# Patient Record
Sex: FEMALE | NOT HISPANIC OR LATINO | URBAN - METROPOLITAN AREA
[De-identification: names, ages, dates, MRNs, and addresses within clinical notes are randomized per-mention and may not be internally consistent; named-entity substitution may affect disease eponyms.]

---

## 2023-11-25 PROBLEM — R42 DIZZINESS: Status: ACTIVE | Noted: 2023-11-25

## 2023-11-25 PROBLEM — H93.8X1 SENSATION OF PLUGGED EAR ON RIGHT SIDE: Status: ACTIVE | Noted: 2023-11-25

## 2023-11-25 PROBLEM — R26.89 IMBALANCE: Status: ACTIVE | Noted: 2023-11-25

## 2023-11-25 PROBLEM — H90.3 ASYMMETRIC SNHL (SENSORINEURAL HEARING LOSS): Status: ACTIVE | Noted: 2023-11-25

## 2023-11-25 PROBLEM — H93.11 TINNITUS, RIGHT EAR: Status: ACTIVE | Noted: 2023-11-25

## 2023-11-25 RX ORDER — METOPROLOL SUCCINATE 50 MG/1
50 TABLET, EXTENDED RELEASE ORAL DAILY
COMMUNITY
Start: 2022-06-15

## 2023-11-29 ENCOUNTER — APPOINTMENT (OUTPATIENT)
Dept: SURGICAL ONCOLOGY | Facility: CLINIC | Age: 58
End: 2023-11-29
Payer: MEDICARE

## 2024-01-02 NOTE — PROGRESS NOTES
Chief Complaint     s/p left PM/SLNB 5/3/2023      History of Present IllnessReferred by vic     57-year-old Ashkenazi [employer],  female here for post op  s/p left PM/SLNB 5/3/2023  here alone today.      History:  1) No abnormal mammograms, breast biopsies or breast surgeries. no prior mammograms.   2) left breast mass for a month  3) 2023. Bilateral diagnostic imaging with bilateral ultrasound. Scattered fibroglandular tissue bilaterally. Right breast is notable for persistent lobulated low-density mass in the central lateral breast mid depth. In the left breast in the area of palpable concern lower inner quadrant there is a spiculated mass. Bilateral ultrasound. Right breast 9:00 3 cm from the nipple is notable for an 8 x 5 x 8 mm solid mass corresponding to the mammographic finding. Right axillary ultrasound is negative. In the left breast in the area of palpable concern corresponding to the mammographic finding at 834 cm from the nipple a 1.5 x 0.8 x 1.1 cm irregular mass was noted, BI-RADS 5. Left axillary ultrasound with 1 indeterminate lymph node and 1 normal lymph node. 2 site left-sided biopsy and right-sided biopsy is recommended.  4) 2023 3 site bx  right breast 9:00N3,benign. concordant U clip  left axillary LN. benign LN hydromark concordant.   left breast mass 8:30N4, grade 1 IDC, ER > 95% ND 40-50% Her2 neg. U clip  5) May 3, 2023. Left partial mastectomy, left mag seed localized sentinel lymph node biopsy. 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 Final margins negative. 5 negative lymph nodes. pT1c pN0 margins neg  6) mammaprint high risk -0.080        She presents today for post op. no breast concerns.      Ob/gyn history  menarche 14  menopause 55   , age of first delivery 20  no OCPs, no fertility treatments, no HRT     sister w breast cancer 60s  mother w uterine cancer     Physical Exam  A chaperone was offered for all portions of the  physical exam. The patient declined.      General appearance: appears stated age, alert and oriented x 3  Head: Normocephalic, atraumatic  Eyes: conjunctivae/corneas clear.  Ears: External ears are normal, hearing is grossly intact.  Lungs: normal breathing  Heart: regular   Abdomen: Soft, nontender, nondistended.  Neurologic: grossly normal  Lymph nodes: No cervical, supraclavicular or axillary lymphadenopathy bilaterally     Breast: A comprehensive breast exam was performed in the seated and supine positions. Breasts are symmetrical. Bilateral nipples are everted. There are no skin changes on arm maneuvers. Bra size: B            Right: no obvious masses. biopsy site clean.            Left: healed incision. no erythema.               *Results/Data  Imaging     reviewed by me  see [facility]     Pathology  4/11/2023 3 site bx  right breast 9:00N3,benign. concordant U clip  left axillary LN. benign LN hydromark concordant.   left breast mass 8:30N4, grade 1 IDC, ER > 95% MD 40-50% Her2 neg. U clip      Provider Impressions     1) 57-year-old Ashkenazi L[employer] day,  female here with new left breast cancer cT1 cN0 grade 1 IDC, ER > 95% MD 40-50% Her2 negative . benign right breast bx. now s/p left PM/SLNB pT1c pN0 margins neg. mammaprint high risk -0.080  2) comorbidities include hypertension. Current tobacco use.  3) Sister with breast cancer      *Patient Discussion/Summary     She is here alone. She is recovering well from surgery. She has no activity restrictions. We reviewed her pathology report she was given a copy. She is happy that the cancer was completely excised and negative margins. Lymph nodes were negative. Her surgical treatment is complete.  pathology review is pending. Her case was reviewed at tumor board yesterday. Oncotype has been ordered for systemic therapy recommendations and as she is a possible SANDY candidate. Bilateral mammogram is due in April. I would like to see her in November for an  exam. She should call the office with any sooner concerns.

## 2024-01-03 ENCOUNTER — APPOINTMENT (OUTPATIENT)
Dept: SURGICAL ONCOLOGY | Facility: CLINIC | Age: 59
End: 2024-01-03
Payer: COMMERCIAL

## 2024-04-17 ENCOUNTER — APPOINTMENT (OUTPATIENT)
Dept: SURGICAL ONCOLOGY | Facility: CLINIC | Age: 59
End: 2024-04-17
Payer: COMMERCIAL

## 2024-04-17 ENCOUNTER — APPOINTMENT (OUTPATIENT)
Dept: RADIOLOGY | Facility: CLINIC | Age: 59
End: 2024-04-17
Payer: COMMERCIAL

## 2024-04-18 ENCOUNTER — HOSPITAL ENCOUNTER (OUTPATIENT)
Dept: RADIOLOGY | Facility: EXTERNAL LOCATION | Age: 59
Discharge: HOME | End: 2024-04-18

## 2024-04-19 DIAGNOSIS — Z85.3 PERSONAL HISTORY OF BREAST CANCER: ICD-10-CM

## 2024-04-19 NOTE — PROGRESS NOTES
Chief Complaint     FU  Left breast cancer      History of Present Illness  Referred by vic     58-year-old Ashkenazi L[employer]-day,  female here for FU  Left breast cancer     History:  1) No abnormal mammograms, breast biopsies or breast surgeries. no prior mammograms.   2) left breast mass for a month  3) 2023. Bilateral diagnostic imaging with bilateral ultrasound. Scattered fibroglandular tissue bilaterally. Right breast is notable for persistent lobulated low-density mass in the central lateral breast mid depth. In the left breast in the area of palpable concern lower inner quadrant there is a spiculated mass. Bilateral ultrasound. Right breast 9:00 3 cm from the nipple is notable for an 8 x 5 x 8 mm solid mass corresponding to the mammographic finding. Right axillary ultrasound is negative. In the left breast in the area of palpable concern corresponding to the mammographic finding at 834 cm from the nipple a 1.5 x 0.8 x 1.1 cm irregular mass was noted, BI-RADS 5. Left axillary ultrasound with 1 indeterminate lymph node and 1 normal lymph node. 2 site left-sided biopsy and right-sided biopsy is recommended.  4) 2023 3 site bx  right breast 9:00N3,benign. concordant U clip  left axillary LN. benign LN hydromark concordant.   left breast mass 8:30N4, grade 1 IDC, ER > 95% IL 40-50% Her2 neg. U clip  5) May 3, 2023. Left partial mastectomy, left mag seed localized sentinel lymph node biopsy. 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 Final margins negative. 5 negative lymph nodes. pT1c pN0 margins neg  6) mammaprint high risk -0.080  7) oncotype 16  8) declined all adjuvant therapies  9) 2024 BL MG BIRADS 2          She presents today for FU  No breast concerns.        Ob/gyn history  menarche 14  menopause 55   , age of first delivery 20  no OCPs, no fertility treatments, no HRT     sister w breast cancer 60s  mother w uterine cancer        Review of  Systems  A comprehensive ROS was taken on the patient intake form and reviewed with the patient. This form is scanned into the electronic medical record.     Constitutional symptoms: Denies generalized fatigue. Denies weight change, fevers/chills, difficulty sleeping   Eyes: Denies double vision, glaucoma, cataracts. +blurry vision  Ear/nose/throat/mouth: Denies hearing changes, sore throat, sinus problems.  Cardiovascular: No chest pain. Denies irregular heartbeat. Denies ankle swelling.  Respiratory: No wheezing, cough, or shortness of breath.  Gastrointestinal: No abdominal pain, No nausea/vomiting. No indigestion/heartburn. No change in bowel habits. No constipation or diarrhea.   Genitourinary: No urinary incontinence. No urinary frequency. No painful urination.  Musculoskeletal: No bone pain, no muscle pain, no joint pain. + back pain  Integumentary: No rash. No masses. No changes in moles. No easy bruising.  Neurological: No headaches. No tremors. No numbness/tingling.  Psychiatric: No anxiety. No depression.  Endocrine: No excessive thirst. Not too hot or too cold. Not tired or fatigued.   Hematological/lymphatic: No swollen glands or blood clotting problems. No bruising.      Physical Exam  A chaperone was offered for all portions of the physical exam. The patient declined.      General appearance: appears stated age, alert and oriented x 3  Head: Normocephalic, atraumatic  Eyes: conjunctivae/corneas clear.  Ears: External ears are normal, hearing is grossly intact.  Lungs: normal breathing  Heart: regular   Abdomen: Soft, nontender, nondistended.  Neurologic: grossly normal  Lymph nodes: No cervical, supraclavicular or axillary lymphadenopathy bilaterally     Breast: A comprehensive breast exam was performed in the seated and supine positions. Breasts are symmetrical. Bilateral nipples are everted. There are no skin changes on arm maneuvers. Bra size: B            Right: no obvious masses.              Left: healed incision. No masses              *Results/Data  Imaging     reviewed by me  see HPI     Pathology  4/11/2023 3 site bx  right breast 9:00N3,benign. concordant U clip  left axillary LN. benign LN hydromark concordant.   left breast mass 8:30N4, grade 1 IDC, ER > 95% AR 40-50% Her2 neg. 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 benign right breast bx. now s/p left PM/SLNB pT1c pN0 margins neg. mammaprint high risk -0.080  2) comorbidities include hypertension. Current tobacco use.  3) Sister with breast cancer      Patient Discussion/Summary     She is here alone.  She declined all adjuvant therapies.  Clinical exam today is normal.  Bilateral mammogram today is negative for suspicious findings.  She is happy to hear this.  We discussed follow-up.  I advised yearly bilateral mammogram with clinical breast exam.  She can do this next year with the nurse practitioner.  She should call our office with any sooner concerns

## 2024-04-22 ENCOUNTER — HOSPITAL ENCOUNTER (OUTPATIENT)
Dept: RADIOLOGY | Facility: CLINIC | Age: 59
Discharge: HOME | End: 2024-04-22
Payer: COMMERCIAL

## 2024-04-22 ENCOUNTER — APPOINTMENT (OUTPATIENT)
Dept: RADIOLOGY | Facility: CLINIC | Age: 59
End: 2024-04-22
Payer: COMMERCIAL

## 2024-04-22 ENCOUNTER — OFFICE VISIT (OUTPATIENT)
Dept: SURGICAL ONCOLOGY | Facility: CLINIC | Age: 59
End: 2024-04-22
Payer: COMMERCIAL

## 2024-04-22 VITALS
BODY MASS INDEX: 28.34 KG/M2 | HEART RATE: 93 BPM | WEIGHT: 173 LBS | DIASTOLIC BLOOD PRESSURE: 89 MMHG | SYSTOLIC BLOOD PRESSURE: 168 MMHG

## 2024-04-22 VITALS — HEIGHT: 66 IN | BODY MASS INDEX: 24.45 KG/M2 | WEIGHT: 152.12 LBS

## 2024-04-22 DIAGNOSIS — Z85.3 PERSONAL HISTORY OF BREAST CANCER: ICD-10-CM

## 2024-04-22 DIAGNOSIS — Z17.0 MALIGNANT NEOPLASM OF LOWER-INNER QUADRANT OF LEFT BREAST IN FEMALE, ESTROGEN RECEPTOR POSITIVE (MULTI): Primary | ICD-10-CM

## 2024-04-22 DIAGNOSIS — C50.312 MALIGNANT NEOPLASM OF LOWER-INNER QUADRANT OF LEFT BREAST IN FEMALE, ESTROGEN RECEPTOR POSITIVE (MULTI): Primary | ICD-10-CM

## 2024-04-22 PROCEDURE — G0279 TOMOSYNTHESIS, MAMMO: HCPCS | Performed by: STUDENT IN AN ORGANIZED HEALTH CARE EDUCATION/TRAINING PROGRAM

## 2024-04-22 PROCEDURE — 99214 OFFICE O/P EST MOD 30 MIN: CPT | Performed by: SURGERY

## 2024-04-22 PROCEDURE — 77066 DX MAMMO INCL CAD BI: CPT | Performed by: STUDENT IN AN ORGANIZED HEALTH CARE EDUCATION/TRAINING PROGRAM

## 2024-04-22 PROCEDURE — 77062 BREAST TOMOSYNTHESIS BI: CPT

## 2024-04-22 RX ORDER — CANDESARTAN 16 MG/1
16 TABLET ORAL
COMMUNITY
Start: 2023-06-30

## 2024-04-22 RX ORDER — DICYCLOMINE HYDROCHLORIDE 10 MG/1
CAPSULE ORAL
COMMUNITY
Start: 2023-03-08

## 2024-04-22 ASSESSMENT — PAIN SCALES - GENERAL: PAINLEVEL: 0-NO PAIN

## 2025-04-15 NOTE — PROGRESS NOTES
Isabella Collazo female   1965 59 y.o.   [id number]      Chief Complaint  ***    History Of Present Illness  Isabella Collazo is a 59 y.o. female presenting with ***. She denies breast biopsy or surgery. She denies family history breast cancer ***.    2023 3 site core biopsy. Right breast benign core biopsy. Left breast core biopsy grade 1 IDC, ER > 95% DC 40-50% Her2 neg. LN benign. May 2023 Dr. Alvarado performed a left partial mastectomy and left mag seed localized sentinel lymph node biopsy. 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 Final margins negative. 5 negative lymph nodes. mammaprint high risk -0.080. Oncotype 16. Declined all adjuvant therapies  pT1c pN0       BREAST IMAGING:    REPRODUCTIVE HISTORY: menarche age 14, , first birth age 20,  menopause age 55, breast tissue                                   FAMILY CANCER HISTORY:    Sister: Breast cancer age 60s  Mother: Uterine cancer   Surgical History  She has a past surgical history that includes Breast biopsy (Right).     Social History  She has no history on file for tobacco use, alcohol use, and drug use.    Family History  Family History   Problem Relation Name Age of Onset    Breast cancer Sister          Allergies  Penicillins, Azithromycin, Erythromycin, and Metronidazole    Medications  Current Outpatient Medications   Medication Instructions    candesartan (ATACAND) 16 mg, oral, Daily RT    dicyclomine (Bentyl) 10 mg capsule oral    metoprolol succinate XL (TOPROL-XL) 50 mg, oral, Daily         REVIEW OF SYSTEMS    Constitutional:  Negative for appetite change, fatigue, fever and unexpected weight change.   HENT:  Negative for ear pain, hearing loss, nosebleeds, sore throat and trouble swallowing.    Eyes:  Negative for discharge, itching and visual disturbance.   Respiratory:  Negative for cough, chest tightness and shortness of breath.    Cardiovascular:  Negative for chest pain, palpitations and leg  swelling.   Breast: as indicated in HPI  Gastrointestinal:  Negative for abdominal pain, constipation, diarrhea and nausea.   Endocrine: Negative for cold intolerance and heat intolerance.   Genitourinary:  Negative for dysuria, frequency, hematuria, pelvic pain and vaginal bleeding.   Musculoskeletal:  Negative for arthralgias, back pain, gait problem, joint swelling and myalgias.   Skin:  Negative for color change and rash.   Allergic/Immunologic: Negative for environmental allergies and food allergies.   Neurological:  Negative for dizziness, tremors, speech difficulty, weakness, numbness and headaches.   Hematological:  Does not bruise/bleed easily.   Psychiatric/Behavioral:  Negative for agitation, dysphoric mood and sleep disturbance. The patient is not nervous/anxious.         Past Medical History  She has a past medical history of Breast cancer (Multi).     Physical Exam  Patient is alert and oriented x3 and in a relaxed and appropriate mood. Her gait is steady and hand grasps are equal. Sclera is clear. The breasts are nearly symmetrical. The tissue is soft without palpable abnormalities, discrete nodules or masses. The skin and nipples appear normal. There is no cervical, supraclavicular or axillary lymphadenopathy. Heart rate and rhythm normal, S1 and S2 appreciated. The lungs are clear to auscultation bilaterally. Abdomen is soft and non-tender.       Physical Exam     Last Recorded Vitals  There were no vitals filed for this visit.    Relevant Results   Time was spent viewing digital images of the radiology testing with the patient. I explained the results in depth, along with suggested explanation for follow up recommendations based on the testing results. BI-RADS Category ***    Imaging  No results found for this or any previous visit from the past 365 days.       Assessment/Plan       PLAN:  ***    Patient Discussion/Summary  Your clinical examination and imaging are normal. Please return in one year  for bilateral screening mammogram and office visit or sooner if you have any problems or concerns.     You can see your health information, review clinical summaries from office visits & test results online when you follow your health with MY  Chart, a personal health record. To sign up go to [email or web address]. If you need assistance with signing up or trouble getting into your account call [facility] Patient Line 24/7 at [phone].    My office phone number is [phone] if you need to get in touch with me or have additional questions or concerns. Thank you for choosing [facility] and trusting me as your healthcare provider. I look forward to seeing you again at your next office visit. I am honored to be a provider on your health care team and I remain dedicated to helping you achieve your health goals.      Brenda Vasques, ZACK-CNP

## 2025-04-22 ENCOUNTER — APPOINTMENT (OUTPATIENT)
Dept: RADIOLOGY | Facility: CLINIC | Age: 60
End: 2025-04-22
Payer: COMMERCIAL

## 2025-04-22 ENCOUNTER — APPOINTMENT (OUTPATIENT)
Dept: SURGICAL ONCOLOGY | Facility: CLINIC | Age: 60
End: 2025-04-22
Payer: COMMERCIAL